# Patient Record
Sex: FEMALE | Race: WHITE | NOT HISPANIC OR LATINO | URBAN - METROPOLITAN AREA
[De-identification: names, ages, dates, MRNs, and addresses within clinical notes are randomized per-mention and may not be internally consistent; named-entity substitution may affect disease eponyms.]

---

## 2018-10-31 ENCOUNTER — APPOINTMENT (OUTPATIENT)
Age: 33
Setting detail: DERMATOLOGY
End: 2018-11-05

## 2018-10-31 VITALS
SYSTOLIC BLOOD PRESSURE: 122 MMHG | DIASTOLIC BLOOD PRESSURE: 80 MMHG | HEART RATE: 73 BPM | HEIGHT: 62 IN | WEIGHT: 128 LBS

## 2018-10-31 DIAGNOSIS — L81.1 CHLOASMA: ICD-10-CM

## 2018-10-31 DIAGNOSIS — D22 MELANOCYTIC NEVI: ICD-10-CM

## 2018-10-31 PROBLEM — D22.39 MELANOCYTIC NEVI OF OTHER PARTS OF FACE: Status: ACTIVE | Noted: 2018-10-31

## 2018-10-31 PROCEDURE — 99203 OFFICE O/P NEW LOW 30 MIN: CPT

## 2018-10-31 PROCEDURE — OTHER TREATMENT REGIMEN: OTHER

## 2018-10-31 PROCEDURE — OTHER MIPS QUALITY: OTHER

## 2018-10-31 PROCEDURE — OTHER COUNSELING: OTHER

## 2018-10-31 PROCEDURE — OTHER PATIENT SPECIFIC COUNSELING: OTHER

## 2018-10-31 ASSESSMENT — LOCATION DETAILED DESCRIPTION DERM
LOCATION DETAILED: LEFT NASAL SIDEWALL
LOCATION DETAILED: RIGHT UPPER CUTANEOUS LIP
LOCATION DETAILED: LEFT MEDIAL MALAR CHEEK
LOCATION DETAILED: RIGHT CENTRAL MALAR CHEEK
LOCATION DETAILED: RIGHT SUPERIOR FOREHEAD
LOCATION DETAILED: LEFT INFERIOR CENTRAL MALAR CHEEK
LOCATION DETAILED: LEFT CENTRAL MALAR CHEEK
LOCATION DETAILED: RIGHT SUPERIOR CENTRAL MALAR CHEEK

## 2018-10-31 ASSESSMENT — LOCATION ZONE DERM
LOCATION ZONE: NOSE
LOCATION ZONE: LIP
LOCATION ZONE: FACE

## 2018-10-31 ASSESSMENT — LOCATION SIMPLE DESCRIPTION DERM
LOCATION SIMPLE: RIGHT CHEEK
LOCATION SIMPLE: RIGHT LIP
LOCATION SIMPLE: LEFT NOSE
LOCATION SIMPLE: LEFT CHEEK
LOCATION SIMPLE: RIGHT FOREHEAD

## 2018-10-31 NOTE — PROCEDURE: PATIENT SPECIFIC COUNSELING
Reassured the patient that these lesions are clinically indicative of a benign lesion. Discussed treatment options including observation, additional shave removal, laser therapy or surgical excision. Risks and benefits of all modalities discussed in detail including scarring and risk of recurrence. As lesions are benign, explained that any removal would be considered a cosmetic out of pocket charge. Patient has been given contact information for the [facility] as well as Dr. Braulio Nichols, she may contact either office at her leisure to schedule a consultation. Reassured the patient that these lesions are clinically indicative of a benign lesion. Discussed treatment options including observation, additional shave removal, laser therapy or surgical excision. Risks and benefits of all modalities discussed in detail including scarring and risk of recurrence. As lesions are benign, explained that any removal would be considered a cosmetic out of pocket charge. Patient has been given contact information for the [facility] clinic as well as Dr. Braulio Nichols, she may contact either office at her leisure to schedule a consultation.

## 2018-10-31 NOTE — PROCEDURE: PATIENT SPECIFIC COUNSELING
Explained etiology of condition in detail with the patient including exacerbating factors such as sunlight exposure, previous pregnancies or oral contraceptive use. Discussed treatment options including observation, topical bleaching creams or laser therapy. Risks and benefits of all modalities discussed in detail. Patient elects to begin use of topical treatment today. Compounded bleaching cream with Hydroquinone at 4% has been called into the [facility]. Explained etiology of condition in detail with the patient including exacerbating factors such as sunlight exposure, previous pregnancies or oral contraceptive use. Discussed treatment options including observation, topical bleaching creams or laser therapy. Risks and benefits of all modalities discussed in detail. Patient elects to begin use of topical treatment today. Compounded bleaching cream with Hydroquinone at 4% has been called into the [facility].